# Patient Record
Sex: FEMALE | Race: WHITE | NOT HISPANIC OR LATINO | Employment: UNEMPLOYED | URBAN - METROPOLITAN AREA
[De-identification: names, ages, dates, MRNs, and addresses within clinical notes are randomized per-mention and may not be internally consistent; named-entity substitution may affect disease eponyms.]

---

## 2017-02-03 PROCEDURE — 99282 EMERGENCY DEPT VISIT SF MDM: CPT

## 2017-02-03 RX ORDER — FUROSEMIDE 40 MG/1
40 TABLET ORAL 2 TIMES DAILY
COMMUNITY

## 2017-02-03 RX ORDER — WARFARIN SODIUM 5 MG/1
5 TABLET ORAL
COMMUNITY

## 2017-02-03 RX ORDER — ZOLPIDEM TARTRATE 10 MG/1
10 TABLET ORAL NIGHTLY PRN
COMMUNITY

## 2017-02-04 ENCOUNTER — HOSPITAL ENCOUNTER (EMERGENCY)
Facility: HOSPITAL | Age: 62
Discharge: HOME | End: 2017-02-04
Attending: EMERGENCY MEDICINE | Admitting: EMERGENCY MEDICINE

## 2017-02-04 VITALS
SYSTOLIC BLOOD PRESSURE: 150 MMHG | BODY MASS INDEX: 44.73 KG/M2 | HEART RATE: 91 BPM | OXYGEN SATURATION: 97 % | HEIGHT: 67 IN | DIASTOLIC BLOOD PRESSURE: 80 MMHG | TEMPERATURE: 98.5 F | RESPIRATION RATE: 18 BRPM | WEIGHT: 285 LBS

## 2017-02-04 DIAGNOSIS — B37.2 CANDIDAL SKIN INFECTION: Primary | ICD-10-CM

## 2017-02-04 DIAGNOSIS — L73.9 ACUTE FOLLICULITIS: ICD-10-CM

## 2017-02-04 RX ORDER — NYSTATIN 100000 [USP'U]/G
POWDER TOPICAL 3 TIMES DAILY
Qty: 30 G | Refills: 0 | Status: SHIPPED | OUTPATIENT
Start: 2017-02-04

## 2017-02-04 RX ORDER — CEPHALEXIN 500 MG/1
500 CAPSULE ORAL 3 TIMES DAILY
Qty: 21 CAPSULE | Refills: 0 | Status: SHIPPED | OUTPATIENT
Start: 2017-02-04

## 2017-02-04 NOTE — ED NOTES
Pt states she thinks she has shingles, but states she has a rash under both breasts, then stated she isn't sure if she has a rash under right breast bc her family told her she did, she has not visualized it, started with pain in left arm on Monday with rash under left breast     Jessica Bhatti RN  02/03/17 [id number]

## 2017-02-04 NOTE — ED PROVIDER NOTES
EMERGENCY DEPARTMENT ENCOUNTER    CHIEF COMPLAINT  Chief Complaint: rash  History given by: patient   History limited by: n/a  Room Number: 11/11  PMD: Kaushik Sanz MD      HPI:  Pt is a 61 y.o. female who presents complaining of a rash under bilateral breast and on her left elbow that began 4 days ago. Pt states that the rash is sore, but not overly painful. Pt also reports a low grade fever. She has no other complaints at this time.    Duration:  4 days  Onset: gradual  Timing: constant  Location: under bilateral breast, left elbow  Radiation: none  Quality: rash  Intensity/Severity: mild  Progression: unchanged  Associated Symptoms: fever   Aggravating Factors: none  Alleviating Factors: none  Previous Episodes: none  Treatment before arrival: none    PAST MEDICAL HISTORY  Active Ambulatory Problems     Diagnosis Date Noted   • No Active Ambulatory Problems     Resolved Ambulatory Problems     Diagnosis Date Noted   • No Resolved Ambulatory Problems     Past Medical History   Diagnosis Date   • Anxiety    • Depression    • Diabetes mellitus    • DVT (deep venous thrombosis)    • Fuchs' corneal dystrophy    • Ganglion cyst    • Hyperlipidemia    • Hypertension    • Kidney stone    • Pulmonary embolism    • Sleep apnea        PAST SURGICAL HISTORY  Past Surgical History   Procedure Laterality Date   • Ureteroscopy laser lithotripsy with stent insertion     • Cholecystectomy     • Back surgery     • Tonsillectomy         FAMILY HISTORY  History reviewed. No pertinent family history.    SOCIAL HISTORY  Social History     Social History   • Marital status:      Spouse name: N/A   • Number of children: N/A   • Years of education: N/A     Occupational History   • Not on file.     Social History Main Topics   • Smoking status: Never Smoker   • Smokeless tobacco: Not on file   • Alcohol use No   • Drug use: No   • Sexual activity: Not on file     Other Topics Concern   • Not on file     Social History  Narrative   • No narrative on file       ALLERGIES  Feldene [piroxicam] and Hydrocodone    REVIEW OF SYSTEMS  Review of Systems   Constitutional: Positive for fever (low grade ). Negative for chills.   HENT: Negative for sore throat.    Respiratory: Negative for cough.    Gastrointestinal: Negative for nausea and vomiting.   Genitourinary: Negative for dysuria.   Musculoskeletal: Negative for back pain.   Skin: Positive for rash.   Psychiatric/Behavioral: The patient is not nervous/anxious.        PHYSICAL EXAM  ED Triage Vitals   Temp Heart Rate Resp BP SpO2   02/03/17 2335 02/03/17 2335 02/03/17 2335 02/03/17 2343 02/03/17 2335   98.6 °F (37 °C) 121 18 193/98 97 %      Temp src Heart Rate Source Patient Position BP Location FiO2 (%)   02/03/17 2335 02/03/17 2335 -- -- --   Tympanic Monitor          Physical Exam   Constitutional: She is oriented to person, place, and time and well-developed, well-nourished, and in no distress. No distress.   HENT:   Head: Normocephalic and atraumatic.   Eyes: EOM are normal. Pupils are equal, round, and reactive to light.   Neck: Normal range of motion. Neck supple.   Cardiovascular: Normal rate, regular rhythm and normal heart sounds.    Pulmonary/Chest: Effort normal and breath sounds normal. No respiratory distress.   Abdominal: Soft. There is no tenderness. There is no rebound and no guarding.   Musculoskeletal: Normal range of motion. She exhibits no edema.   Neurological: She is alert and oriented to person, place, and time.   Skin: Skin is warm and dry. Rash (red rash under both breasts. ) noted.   3 areas of folliculitis on the left arm .    Psychiatric: Mood and affect normal.   Nursing note and vitals reviewed.      PROCEDURES  Procedures      PROGRESS AND CONSULTS  ED Course   Comment By Time   12:58 AM  Patient with rash under bilateral breasts.  Appears to be candida infection.  Does have small area of folliculitis.  Will treat with nystatin powder.  Will give keflex  that she will hold for two more days.  Start if no better.  Will need to monitor INR. Cody Dudley MD 02/04 0058     00:53  Checked the patient who is in NAD and is resting comfortably. Advised pt that the rash under her breast is likely caused by yeast. The rash on her left elbow does not appear to be shingles. Will provide rx for Keflex, but advised pt to watch the rash for 2 days prior to starting the abx. Pt understands and agrees with the plan, all questions answered.    MEDICAL DECISION MAKING  Results were reviewed/discussed with the patient and they were also made aware of online access. Pt also made aware that some labs, such as cultures, will not be resulted during ER visit and follow up with PMD is necessary.     MDM       DIAGNOSIS  Final diagnoses:   Candidal skin infection   Acute folliculitis       DISPOSITION  DISCHARGE    Patient discharged in stable condition.    Reviewed implications of results, diagnosis, meds, responsibility to follow up, warning signs and symptoms of possible worsening, potential complications and reasons to return to ER.    Patient/Family voiced understanding of above instructions.    Discussed plan for discharge, as there is no emergent indication for admission.  Pt/family is agreeable and understands need for follow up and repeat testing.  Pt is aware that discharge does not mean that nothing is wrong but it indicates no emergency is present that requires admission and they must continue care with follow-up as given below or physician of their choice.     FOLLOW-UP  Kaushik Sanz MD  [address] [id number] [phone]               Medication List      New Prescriptions          cephalexin 500 MG capsule   Commonly known as:  KEFLEX   Take 1 capsule by mouth 3 (Three) Times a Day.       nystatin [id number] UNIT/GM powder   Commonly known as:  MYCOSTATIN   Apply  topically 3 (Three) Times a Day.         Latest Documented Vital Signs:  As of 1:07  AM  BP- 150/80 HR- 91 Temp- 98.5 °F (36.9 °C) O2 sat- 97%    --  Documentation assistance provided by carlo Yusuf for Dr Dudley.  Information recorded by the carlo was done at my direction and has been verified and validated by me.       Arely Yusuf  02/04/17 0107       Cody Dudley MD  02/04/17 0130